# Patient Record
Sex: FEMALE | Race: WHITE | Employment: UNEMPLOYED | URBAN - METROPOLITAN AREA
[De-identification: names, ages, dates, MRNs, and addresses within clinical notes are randomized per-mention and may not be internally consistent; named-entity substitution may affect disease eponyms.]

---

## 2018-06-04 ENCOUNTER — HOSPITAL ENCOUNTER (EMERGENCY)
Age: 33
Discharge: HOME | End: 2018-06-04
Attending: EMERGENCY MEDICINE
Payer: SELF-PAY

## 2018-06-04 ENCOUNTER — APPOINTMENT (OUTPATIENT)
Dept: ULTRASOUND IMAGING | Age: 33
End: 2018-06-04
Attending: NURSE PRACTITIONER
Payer: SELF-PAY

## 2018-06-04 VITALS
HEIGHT: 61 IN | RESPIRATION RATE: 16 BRPM | SYSTOLIC BLOOD PRESSURE: 119 MMHG | DIASTOLIC BLOOD PRESSURE: 78 MMHG | OXYGEN SATURATION: 96 % | HEART RATE: 58 BPM | TEMPERATURE: 98.2 F | WEIGHT: 142.06 LBS | BODY MASS INDEX: 26.82 KG/M2

## 2018-06-04 DIAGNOSIS — Z32.02 PREGNANCY TEST NEGATIVE: Primary | ICD-10-CM

## 2018-06-04 DIAGNOSIS — Z97.5 IUD (INTRAUTERINE DEVICE) IN PLACE: ICD-10-CM

## 2018-06-04 LAB — HCG UR QL: NEGATIVE

## 2018-06-04 PROCEDURE — 99283 EMERGENCY DEPT VISIT LOW MDM: CPT

## 2018-06-04 PROCEDURE — 76817 TRANSVAGINAL US OBSTETRIC: CPT

## 2018-06-04 PROCEDURE — 81025 URINE PREGNANCY TEST: CPT

## 2018-06-04 PROCEDURE — 76801 OB US < 14 WKS SINGLE FETUS: CPT

## 2018-06-04 NOTE — ED NOTES
9:48 AM  I have evaluated the patient as the Provider in Triage. I have reviewed Her vital signs and the triage nurse assessment. I have talked with the patient and any available family and advised that I am the provider in triage and have ordered the appropriate study to initiate their work up based on the clinical presentation during my assessment. I have advised that the patient will be accommodated in the Main ED as soon as possible. I have also requested to contact the triage nurse or myself immediately if the patient experiences any changes in their condition during this brief waiting period. States that she has an IUD in and has had 4 positive pregnancy tests.  Last intercourse May 10  Denies vaginal bleeding and cramping  Yoanna Claire, ALAYNA

## 2018-06-04 NOTE — DISCHARGE INSTRUCTIONS
PLEASE FOLLOW-UP WITH OB/GYN AS WE DISCUSSED FOR REGULAR CARE. RETURN TO THE ER FOR ANY WORSENING OR CONCERNING SYMPTOMS LIKE PAIN AND ABNORMAL BLEEDING     Intrauterine Device (IUD) for Birth Control: Care Instructions  Your Care Instructions    The intrauterine device (IUD) is used to prevent pregnancy. It's a small, plastic, T-shaped device. Your doctor places the IUD in your uterus. You are using either a hormonal IUD or a copper IUD. · Hormonal IUDs prevent pregnancy for 3 to 5 years, depending on which IUD is used. Once you have it, you don't have to do anything else to prevent pregnancy. · The copper IUD prevents pregnancy for 10 years. Once you have it, you don't have to do anything to prevent pregnancy. A string tied to the end of the IUD hangs down through the opening of the uterus (called the cervix) into the vagina. You can check that the IUD is in place by feeling for the string. The IUD usually stays in the uterus until your doctor removes it. Follow-up care is a key part of your treatment and safety. Be sure to make and go to all appointments, and call your doctor if you are having problems. It's also a good idea to know your test results and keep a list of the medicines you take. How can you care for yourself at home? How do you use the IUD? · Your doctor inserts the IUD. This takes only a few minutes and can be done at your doctor's office. · Your doctor may have you feel for the IUD string right after insertion, to be sure you know what it feels like. · Check for the string after every period. ¨ Insert a finger into your vagina and feel for the cervix, which is at the top of the vagina and feels harder than the rest of your vagina (some women say it feels like the tip of your nose). ¨ You should be able to feel the thin, plastic string coming out of the opening of your cervix. If you cannot feel the string, it doesn't always mean that the IUD is out of place.  Sometimes the string is just difficult to feel or has been pulled up into the cervical canal (which will not harm you). · Your doctor may want to see you 4 to 6 weeks after the IUD insertion, to make sure it is in place. What if you think the IUD is not in place? Always read the label for specific instructions. Here are some basic guidelines:  · Call your doctor and use backup birth control, such as a condom, or don't have intercourse until you know the IUD is working. · If you have had intercourse, you can use emergency contraception, such as the morning-after pill (Plan B). You can use emergency contraception for up to 5 days after having had intercourse, but it works best if you take it right away. What else do you need to know? · The IUD has side effects. ¨ The hormonal IUD usually reduces menstrual flow and cramping over time. It can also cause spotting, mood swings, and breast tenderness. ¨ The copper IUD can cause longer and heavier periods. · After an IUD is first put in, you may have some mild cramping and light spotting for 1 to 2 days. · The IUD doesn't protect against sexually transmitted infections (STIs), such as herpes or HIV/AIDS. If you're not sure whether your sex partner might have an STI, use a condom to protect against disease. When should you call for help? Call your doctor now or seek immediate medical care if:  ? · You have pain in your belly or pelvis. ? · You have severe vaginal bleeding. This means that you are soaking through your usual pads or tampons each hour for 2 or more hours. ? · You have vaginal discharge that smells bad.   ? · You have a fever. ? Watch closely for changes in your health, and be sure to contact your doctor if you have any problems. Where can you learn more? Go to http://maxime-hugo.info/. Enter P153 in the search box to learn more about \"Intrauterine Device (IUD) for Birth Control: Care Instructions. \"  Current as of: March 16, 2017  Content Version: 11.4  © 7096-8775 [employer]. Care instructions adapted under license by [employer] (which disclaims liability or warranty for this information). If you have questions about a medical condition or this instruction, always ask your healthcare professional. Vinicius Samaniego any warranty or liability for your use of this information. [facility] Departments     For adult and child immunizations, family planning, TB screening, STD testing and women's health services. [employer]: Lance [phone]      [facility]  25   657 [facility]   [address]   [address] [facility] Street: Nikki Comer [address] [facility] [address] [phone]      [address] [facility]          [address] [place] [address] [address]     For primary care services, woman and child wellness, and some clinics providing specialty care. [facility] -- [address] [facility]. [address] [place] [address] [phone] 411 [facility] 200 [facility] [address] [place] [address] [phone]   [address] [place] [address] [address] [place] [address] [phone]   [address] [facility] [address] [place] [address] [address]  Community  [phone]   [address] [place] [address] [phone]   [facility] [address] [facility] [phone]   Brian Wright [address] [phone]   [facility]: [facility] 700 katelyn Corea [address] [place] [address], [address] [phone]     [facility] [address] [facility] [address] [phone]   [facility] Outreach [address]  [phone]   [employer]  [address], [place] [address] ([email or web address]) [phone]         Sexual Health/Woman Wellness Clinics    For STD/HIV testing and treatment, pregnancy testing and services, men's health, birth control services, LGBT services, and hepatitis/HPV vaccine services.    [facility] [address] [facility] [address] [facility] [address] [facility] [address] [phone]   [facility] [address], 5th floor [phone]   Pregnancy [address] Women [address]  [place] [phone]         [facility] [address] [facility] [address] [place] [address]   [phone]   [place]   [phone]   Women, Infant and Children's Services: [address] [phone]       600 [facility]   [phone]   [facility]   [phone]   [facility] Psychiatry     [phone]   [address] Crisis   [address] [facility] [address] [phone]     Local Primary Care Physicians  [facility] [phone]  MD Salvador Zaldivar MD Jimmye Squibb, MD [facility] Doctors [phone]  [facility]  MD Kuldip Escobar MD Tyra Bogus, MD   [place] [address] [phone]  MD Jeff Phipps [place] [address] [facility] [phone]  MD Manny Lala MD Jarrell Mates, MD Torrance Bookman, MD   [facility] [phone]  BNCV DOTDSP VANNESSA, MD Nettie Medellin [place], NP [address] [phone]  MD Juan Chung MD Derril Mountain, MD Cipriano Lulas, MD Roselee Hedges, MD Garrick Eden, MD Wava Mailman, MD   [address] [facility] [phone]  Samuel Bradley MD [facility] [phone]  MD Karina Cristobal, NP  Ishmael Seals, MD Ananias Greathouse, MD Blaise Meigs, MD Orma Cohens, MD Curlie Corwin, MD   [address] [facility] [phone]  MD Maryanne Garner, [facility] Z[place], ALAYNA Bryson, MD Gloria Ac, MD Shelley Avery, MD Mimi Holland, MD [facility] [phone]  MD Vy Carmichael, MD Esteban Kramer, MD Erick Orr, MD Paulla Fleischer, MD   [facility] [phone]  MD Parrish Charles MD [place] [phone]  MD Danitza Patel MD Unice Quam, MD   [facility] [phone]  Demetrius Ayala, MD Toni Huffman, MD Michi Timmons, MD Brandon Hutchinson, MD Miah Gaxiola, MD Estee Helms, NP  Dennys Watts MD 1619 [facility]   [phone]  MD Doris Marquez, MD Omar Núñez MD   2102 [facility] [phone]  Keyanna Ozuna, MD Nalini Rm, FNP  Janet Marie, PA-C  Janet Marie, FNP  Devi Chauhan, PA-C  Jannet Mark, MD Reynold Carmichael, NP   Elvis Joyce, DO Miscellaneous:  Merlin Iles, [phone]

## 2018-06-04 NOTE — ED TRIAGE NOTES
Pt stated she has an IUD in and has taken 4 pregnancy test that were positive, last sex was May 10th , denies abd pain or vaginal bleeding

## 2018-06-04 NOTE — ED PROVIDER NOTES
HPI Comments: 33 y.o. female with past medical history significant for asthma and chlamydia who presents from home for a pregnancy test. Patient states that she currently has an IUD in place. On Saturday she took a home pregnancy test that was positive. Reports that she had a total of 4 positive home pregnancy tests. She is concerned because she currently has an IUD in. Reports that she is also feeling tired and her breasts are tender. Denies pelvic pain, abdominal cramping, and vaginal bleeding. LMP unknown, she states that she \"spots a lot\". Last sexual intercourse May 10, 2018. Does not currently have an OB/GYN, does not have insurance. [id number]-L3. There are no other acute medical concerns at this time. Denies chest pain, shortness of breath, vomiting/diarrhea,  symptoms, fever, rash, back pain   Social hx: smokes 1/2 ppd, denies smoking   PCP: None        Patient is a 33 y.o. female presenting with pregnancy test. The history is provided by the patient. Pregnancy Test    Pertinent negatives include no fever, no diarrhea, no vomiting, no dysuria, no chest pain and no back pain. Past Medical History:   Diagnosis Date    Asthma     age 16 treatment with inhaler, no problems now    Chlamydia     8560    Complication of anesthesia     unknown    HX OTHER MEDICAL     2008 chlamydia with treatment, neg now    HX OTHER MEDICAL     HBsAg negative        Past Surgical History:   Procedure Laterality Date    DILATION AND CURETTAGE      elective termination    HX OTHER SURGICAL           Family History:   Problem Relation Age of Onset    Heart Disease Mother     Stroke Mother     Stroke Maternal Grandmother        Social History     Social History    Marital status:      Spouse name: N/A    Number of children: N/A    Years of education: N/A     Occupational History    Not on file.      Social History Main Topics    Smoking status: Current Some Day Smoker    Smokeless tobacco: Never Used  Alcohol use No    Drug use: No    Sexual activity: Yes     Partners: Male     Other Topics Concern    Not on file     Social History Narrative         ALLERGIES: Sulfa (sulfonamide antibiotics)    Review of Systems   Constitutional: Negative for fever. Respiratory: Negative for shortness of breath. Cardiovascular: Negative for chest pain. Gastrointestinal: Negative for abdominal pain, diarrhea and vomiting. Genitourinary: Negative for difficulty urinating, dysuria, pelvic pain and vaginal bleeding. Musculoskeletal: Negative for back pain. Skin: Negative for rash. Psychiatric/Behavioral: Negative for behavioral problems and confusion. All other systems reviewed and are negative. Vitals:    06/04/18 0949   BP: 143/82   Pulse: 98   Resp: 16   Temp: 98.5 °F (36.9 °C)   SpO2: 100%   Weight: 64.4 kg (142 lb 1 oz)   Height: 5' 1\" (1.549 m)            Physical Exam   Constitutional: She is oriented to person, place, and time. She appears well-developed and well-nourished. Non-toxic appearance. She does not have a sickly appearance. No distress. HENT:   Head: Normocephalic and atraumatic. Right Ear: External ear normal.   Left Ear: External ear normal.   Nose: Nose normal.   Eyes: Conjunctivae and EOM are normal.   Neck: Normal range of motion. Neck supple. Cardiovascular: Normal rate, regular rhythm, normal heart sounds and intact distal pulses. No murmur heard. Pulmonary/Chest: Effort normal and breath sounds normal. No respiratory distress. She has no wheezes. She has no rales. Abdominal: Soft. Bowel sounds are normal. She exhibits no distension and no mass. There is no tenderness. Musculoskeletal: Normal range of motion. She exhibits no edema or deformity. Neurological: She is alert and oriented to person, place, and time. Skin: Skin is warm and dry. No rash noted. Psychiatric: She has a normal mood and affect.  Her behavior is normal. Judgment and thought content normal. MDM  Number of Diagnoses or Management Options  IUD (intrauterine device) in place:   Pregnancy test negative:   Diagnosis management comments: Patient states that she has had 4 positive home pregnancy tests. States that she left the tests out overnight and the line gradually got darker. Has an IUD in and is worried that could interfere with a pregnancy. Vitals within normal limits, patient non-toxic and in no acute distress     Urine hcg negative here in the ER. Ultrasound shows an intrauterine device with no pregnancy identified. Ovaries are normal in appearance. Patient continues to have no pain or GYN symptoms. Discussed results with attending MD and no further diagnostic testing warranted at this time. Plan: discharge with follow-up to OB/GYN and return to the ER for any worsening or concerning symptoms        Amount and/or Complexity of Data Reviewed  Discuss the patient with other providers: yes Sisto Seat )          ED Course   Discussed patient with attending Byron Menard MD who is in agreement with the plan of care  Almita Cortez NP    Patient's results have been reviewed with them. Patient and/or family have verbally conveyed their understanding and agreement of the patient's signs, symptoms, diagnosis, treatment and prognosis and additionally agree to follow up as recommended or return to the Emergency Room should their condition change prior to follow-up. Discharge instructions have also been provided to the patient with some educational information regarding their diagnosis as well a list of reasons why they would want to return to the ER prior to their follow-up appointment should their condition change.   Almita Cortez NP      Procedures